# Patient Record
Sex: MALE | Race: WHITE | NOT HISPANIC OR LATINO | Employment: PART TIME | URBAN - METROPOLITAN AREA
[De-identification: names, ages, dates, MRNs, and addresses within clinical notes are randomized per-mention and may not be internally consistent; named-entity substitution may affect disease eponyms.]

---

## 2017-09-09 PROCEDURE — 305948 HCHG GREEN TRAUMA ACT PRE-NOTIFY NO CC

## 2017-09-09 PROCEDURE — 96375 TX/PRO/DX INJ NEW DRUG ADDON: CPT

## 2017-09-09 PROCEDURE — 700111 HCHG RX REV CODE 636 W/ 250 OVERRIDE (IP): Performed by: EMERGENCY MEDICINE

## 2017-09-09 PROCEDURE — 96374 THER/PROPH/DIAG INJ IV PUSH: CPT

## 2017-09-09 PROCEDURE — 99285 EMERGENCY DEPT VISIT HI MDM: CPT

## 2017-09-09 RX ADMIN — CEFAZOLIN SODIUM 1 G: 1 INJECTION, SOLUTION INTRAVENOUS at 23:57

## 2017-09-10 ENCOUNTER — APPOINTMENT (OUTPATIENT)
Dept: RADIOLOGY | Facility: MEDICAL CENTER | Age: 22
End: 2017-09-10
Attending: EMERGENCY MEDICINE
Payer: COMMERCIAL

## 2017-09-10 ENCOUNTER — HOSPITAL ENCOUNTER (EMERGENCY)
Facility: MEDICAL CENTER | Age: 22
End: 2017-09-10
Attending: EMERGENCY MEDICINE
Payer: COMMERCIAL

## 2017-09-10 ENCOUNTER — HOSPITAL ENCOUNTER (OUTPATIENT)
Dept: RADIOLOGY | Facility: MEDICAL CENTER | Age: 22
End: 2017-09-10

## 2017-09-10 VITALS
RESPIRATION RATE: 12 BRPM | DIASTOLIC BLOOD PRESSURE: 51 MMHG | BODY MASS INDEX: 28.25 KG/M2 | WEIGHT: 180 LBS | OXYGEN SATURATION: 94 % | SYSTOLIC BLOOD PRESSURE: 127 MMHG | TEMPERATURE: 97.8 F | HEIGHT: 67 IN | HEART RATE: 55 BPM

## 2017-09-10 DIAGNOSIS — T07.XXXA MULTIPLE ABRASIONS: ICD-10-CM

## 2017-09-10 DIAGNOSIS — S62.309A FRACTURE OF METACARPAL BONE: ICD-10-CM

## 2017-09-10 PROCEDURE — 700111 HCHG RX REV CODE 636 W/ 250 OVERRIDE (IP): Performed by: EMERGENCY MEDICINE

## 2017-09-10 PROCEDURE — 302874 HCHG BANDAGE ACE 2 OR 3""

## 2017-09-10 PROCEDURE — 303484 HCHG DRESSING LARGE

## 2017-09-10 PROCEDURE — 29125 APPL SHORT ARM SPLINT STATIC: CPT

## 2017-09-10 PROCEDURE — 700101 HCHG RX REV CODE 250: Performed by: EMERGENCY MEDICINE

## 2017-09-10 PROCEDURE — 73630 X-RAY EXAM OF FOOT: CPT | Mod: RT

## 2017-09-10 PROCEDURE — 303485 HCHG DRESSING MEDIUM

## 2017-09-10 PROCEDURE — 96376 TX/PRO/DX INJ SAME DRUG ADON: CPT

## 2017-09-10 PROCEDURE — 304217 HCHG IRRIGATION SYSTEM

## 2017-09-10 PROCEDURE — 73140 X-RAY EXAM OF FINGER(S): CPT | Mod: LT

## 2017-09-10 RX ORDER — ONDANSETRON 2 MG/ML
4 INJECTION INTRAMUSCULAR; INTRAVENOUS ONCE
Status: COMPLETED | OUTPATIENT
Start: 2017-09-10 | End: 2017-09-10

## 2017-09-10 RX ORDER — CEPHALEXIN 500 MG/1
500 CAPSULE ORAL 3 TIMES DAILY
Qty: 21 QUANTITY SUFFICIENT | Refills: 0 | Status: SHIPPED | OUTPATIENT
Start: 2017-09-10

## 2017-09-10 RX ORDER — OXYCODONE AND ACETAMINOPHEN 10; 325 MG/1; MG/1
1 TABLET ORAL EVERY 4 HOURS PRN
Qty: 30 TAB | Refills: 0 | Status: SHIPPED | OUTPATIENT
Start: 2017-09-10

## 2017-09-10 RX ADMIN — HYDROMORPHONE HYDROCHLORIDE 0.5 MG: 1 INJECTION, SOLUTION INTRAMUSCULAR; INTRAVENOUS; SUBCUTANEOUS at 01:52

## 2017-09-10 RX ADMIN — HYDROMORPHONE HYDROCHLORIDE 0.5 MG: 1 INJECTION, SOLUTION INTRAMUSCULAR; INTRAVENOUS; SUBCUTANEOUS at 03:32

## 2017-09-10 RX ADMIN — LIDOCAINE HYDROCHLORIDE 5 ML: 20 JELLY TOPICAL at 00:31

## 2017-09-10 RX ADMIN — ONDANSETRON 4 MG: 2 INJECTION INTRAMUSCULAR; INTRAVENOUS at 00:54

## 2017-09-10 RX ADMIN — HYDROMORPHONE HYDROCHLORIDE 1 MG: 1 INJECTION, SOLUTION INTRAMUSCULAR; INTRAVENOUS; SUBCUTANEOUS at 00:55

## 2017-09-10 RX ADMIN — LIDOCAINE HYDROCHLORIDE 30 ML: 20 JELLY TOPICAL at 03:35
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
## 2017-09-10 NOTE — ED NOTES
Discharge instructions given to patient, parent, and spouse; all verbalized understanding. Patient's VS WNL upon discharge. PIV discontinued. Patient given two paper prescriptions and advised not to drink or drive; patient verbalized understanding. Patient taken to car outside ER Lobby via w/c by RN and assisted into car.

## 2017-09-10 NOTE — DISCHARGE INSTRUCTIONS
Hand Fracture  Your caregiver has diagnosed you with a fractured (broken) bone in your hand. If the bones are in good position and the hand is properly immobilized and rested, these injuries will usually heal in 3 to 6 weeks. A cast, splint, or bulky bandage is usually applied to keep the fracture site from moving. Do not remove the splint or cast until your caregiver approves. If the fracture is unstable or the bones are not aligned properly, surgery may be needed.  Keep your hand raised (elevated) above the level of your heart as much as possible for the next 2 to 3 days until the swelling and pain are better. Apply ice packs for 15-20 minutes every 3 to 4 hours to help control the pain and swelling.  See your caregiver or an orthopedic specialist as directed for follow-up care to make sure the fracture is beginning to heal properly.  SEEK IMMEDIATE MEDICAL CARE IF:   · You notice your fingers are cold, numb, crooked, or the pain of your injury is severe.  · You are not improving or seem to be getting worse.  · You have questions or concerns.  Document Released: 01/25/2006 Document Revised: 03/11/2013 Document Reviewed: 06/15/2010  [employer]® Patient Information ©2014 [employer].
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
